# Patient Record
Sex: MALE | Race: WHITE | Employment: FULL TIME | URBAN - NONMETROPOLITAN AREA
[De-identification: names, ages, dates, MRNs, and addresses within clinical notes are randomized per-mention and may not be internally consistent; named-entity substitution may affect disease eponyms.]

---

## 2017-06-30 PROBLEM — K92.0 HEMATEMESIS: Status: ACTIVE | Noted: 2017-06-30

## 2017-06-30 PROBLEM — K92.1 MELENA: Status: ACTIVE | Noted: 2017-06-30

## 2018-03-12 ENCOUNTER — HOSPITAL ENCOUNTER (EMERGENCY)
Age: 33
Discharge: HOME | End: 2018-03-12
Attending: EMERGENCY MEDICINE
Payer: COMMERCIAL

## 2018-03-12 VITALS
OXYGEN SATURATION: 95 % | DIASTOLIC BLOOD PRESSURE: 82 MMHG | TEMPERATURE: 98.2 F | RESPIRATION RATE: 16 BRPM | BODY MASS INDEX: 29.22 KG/M2 | WEIGHT: 235 LBS | HEIGHT: 75 IN | HEART RATE: 51 BPM | SYSTOLIC BLOOD PRESSURE: 139 MMHG

## 2018-03-12 DIAGNOSIS — R10.31 ABDOMINAL PAIN, RIGHT LOWER QUADRANT: ICD-10-CM

## 2018-03-12 DIAGNOSIS — R11.2 NAUSEA AND VOMITING, INTRACTABILITY OF VOMITING NOT SPECIFIED, UNSPECIFIED VOMITING TYPE: Primary | ICD-10-CM

## 2018-03-12 LAB
ALBUMIN SERPL-MCNC: 4.2 G/DL (ref 3.5–5.2)
ALP BLD-CCNC: 166 U/L (ref 40–129)
ALT SERPL-CCNC: 79 U/L (ref 0–40)
ANION GAP SERPL CALCULATED.3IONS-SCNC: 11 MMOL/L (ref 7–16)
AST SERPL-CCNC: 48 U/L (ref 0–39)
BACTERIA: NORMAL /HPF
BASOPHILS ABSOLUTE: 0.04 E9/L (ref 0–0.2)
BASOPHILS RELATIVE PERCENT: 0.3 % (ref 0–2)
BILIRUB SERPL-MCNC: 0.7 MG/DL (ref 0–1.2)
BILIRUBIN URINE: NEGATIVE
BLOOD, URINE: ABNORMAL
BUN BLDV-MCNC: 14 MG/DL (ref 6–20)
CALCIUM SERPL-MCNC: 9.4 MG/DL (ref 8.6–10.2)
CHLORIDE BLD-SCNC: 101 MMOL/L (ref 98–107)
CLARITY: CLEAR
CO2: 30 MMOL/L (ref 22–29)
COLOR: YELLOW
CREAT SERPL-MCNC: 0.9 MG/DL (ref 0.7–1.2)
EOSINOPHILS ABSOLUTE: 0.11 E9/L (ref 0.05–0.5)
EOSINOPHILS RELATIVE PERCENT: 0.9 % (ref 0–6)
EPITHELIAL CELLS, UA: NORMAL /HPF
GFR AFRICAN AMERICAN: >60
GFR NON-AFRICAN AMERICAN: >60 ML/MIN/1.73
GLUCOSE BLD-MCNC: 125 MG/DL (ref 74–109)
GLUCOSE URINE: NEGATIVE MG/DL
HCT VFR BLD CALC: 41 % (ref 37–54)
HEMOGLOBIN: 14.6 G/DL (ref 12.5–16.5)
IMMATURE GRANULOCYTES #: 0.04 E9/L
IMMATURE GRANULOCYTES %: 0.3 % (ref 0–5)
KETONES, URINE: NEGATIVE MG/DL
LACTIC ACID: 1.2 MMOL/L (ref 0.5–2.2)
LEUKOCYTE ESTERASE, URINE: NEGATIVE
LYMPHOCYTES ABSOLUTE: 1.28 E9/L (ref 1.5–4)
LYMPHOCYTES RELATIVE PERCENT: 10.8 % (ref 20–42)
MCH RBC QN AUTO: 30 PG (ref 26–35)
MCHC RBC AUTO-ENTMCNC: 35.6 % (ref 32–34.5)
MCV RBC AUTO: 84.2 FL (ref 80–99.9)
MONOCYTES ABSOLUTE: 0.52 E9/L (ref 0.1–0.95)
MONOCYTES RELATIVE PERCENT: 4.4 % (ref 2–12)
NEUTROPHILS ABSOLUTE: 9.82 E9/L (ref 1.8–7.3)
NEUTROPHILS RELATIVE PERCENT: 83.3 % (ref 43–80)
NITRITE, URINE: NEGATIVE
PDW BLD-RTO: 12.1 FL (ref 11.5–15)
PH UA: 6.5 (ref 5–9)
PLATELET # BLD: 229 E9/L (ref 130–450)
PMV BLD AUTO: 10.7 FL (ref 7–12)
POTASSIUM SERPL-SCNC: 4 MMOL/L (ref 3.5–5)
PROTEIN UA: 100 MG/DL
RBC # BLD: 4.87 E12/L (ref 3.8–5.8)
RBC UA: >20 /HPF (ref 0–2)
SODIUM BLD-SCNC: 142 MMOL/L (ref 132–146)
SPECIFIC GRAVITY UA: 1.02 (ref 1–1.03)
TOTAL PROTEIN: 7.5 G/DL (ref 6.4–8.3)
UROBILINOGEN, URINE: 0.2 E.U./DL
WBC # BLD: 11.8 E9/L (ref 4.5–11.5)
WBC UA: NORMAL /HPF (ref 0–5)

## 2018-03-12 PROCEDURE — 81003 URINALYSIS AUTO W/O SCOPE: CPT

## 2018-03-12 PROCEDURE — 83605 ASSAY OF LACTIC ACID: CPT

## 2018-03-12 PROCEDURE — 36415 COLL VENOUS BLD VENIPUNCTURE: CPT

## 2018-03-12 PROCEDURE — 81015 MICROSCOPIC EXAM OF URINE: CPT

## 2018-03-12 PROCEDURE — 80053 COMPREHEN METABOLIC PANEL: CPT

## 2018-03-12 PROCEDURE — 99283 EMERGENCY DEPT VISIT LOW MDM: CPT

## 2018-03-12 PROCEDURE — 85025 COMPLETE CBC W/AUTO DIFF WBC: CPT

## 2018-03-12 RX ORDER — DICYCLOMINE HYDROCHLORIDE 10 MG/1
10 CAPSULE ORAL
Qty: 30 CAPSULE | Refills: 11 | Status: SHIPPED | OUTPATIENT
Start: 2018-03-12 | End: 2018-04-11

## 2018-03-12 RX ORDER — PANTOPRAZOLE SODIUM 20 MG/1
40 TABLET, DELAYED RELEASE ORAL DAILY
Qty: 30 TABLET | Refills: 0 | Status: ON HOLD | OUTPATIENT
Start: 2018-03-12 | End: 2018-08-04 | Stop reason: HOSPADM

## 2018-03-12 ASSESSMENT — PAIN DESCRIPTION - LOCATION: LOCATION: ABDOMEN

## 2018-03-12 ASSESSMENT — PAIN SCALES - GENERAL: PAINLEVEL_OUTOF10: 5

## 2018-03-12 ASSESSMENT — PAIN DESCRIPTION - PAIN TYPE: TYPE: ACUTE PAIN

## 2018-03-12 ASSESSMENT — PAIN DESCRIPTION - DESCRIPTORS: DESCRIPTORS: CONSTANT;SHARP

## 2018-03-12 ASSESSMENT — PAIN DESCRIPTION - ORIENTATION: ORIENTATION: RIGHT;LOWER

## 2018-03-13 NOTE — ED TRIAGE NOTES
Patient complaint of sharp right lower quad pain starting at 2000, with vomiting, eased up just prior to arrival but still painful. Patient ate fish and cauliflower for dinner tonight. Patient is alert and oriented times 4, skin is warm and dry, respirations appear easy and unlabored, patient resting in bed in no acute distress.   Electronically signed by Moisés Varma RN on 3/12/2018 at 9:50 PM

## 2018-03-13 NOTE — ED PROVIDER NOTES
The history is provided by the patient and the spouse. No  was used. Nausea & Vomiting   Severity:  Moderate  Duration:  12 hours  Timing:  Intermittent  Number of daily episodes:  3 today  Quality:  Stomach contents and undigested food  Able to tolerate:  Solids  Progression:  Resolved  Chronicity:  New  Recent urination:  Normal  Relieved by:  Nothing  Worsened by:  Nothing  Ineffective treatments:  None tried  Associated symptoms: abdominal pain and chills    Associated symptoms: no diarrhea, no fever, no headaches, no sore throat and no URI        Review of Systems   Constitutional: Positive for chills. Negative for fever. HENT: Negative for sore throat. Gastrointestinal: Positive for abdominal pain and nausea. Negative for diarrhea. Neurological: Negative for headaches. All other systems reviewed and are negative. Physical Exam   Constitutional: He is oriented to person, place, and time. He appears well-developed and well-nourished. HENT:   Head: Normocephalic and atraumatic. Right Ear: External ear normal.   Left Ear: External ear normal.   Eyes: EOM are normal. Pupils are equal, round, and reactive to light. Neck: Normal range of motion. Neck supple. Cardiovascular: Normal rate, regular rhythm, normal heart sounds and intact distal pulses. Pulmonary/Chest: Effort normal and breath sounds normal.   Abdominal: Soft. Bowel sounds are normal. There is tenderness (right lower quadrants). Genitourinary:   Genitourinary Comments: deferred   Musculoskeletal: Normal range of motion. Neurological: He is alert and oriented to person, place, and time. He has normal reflexes. Skin: Skin is warm. Psychiatric: He has a normal mood and affect. His behavior is normal. Judgment and thought content normal.   Nursing note and vitals reviewed.       Procedures    MDM    ED Course as of Mar 14 0819   Mon Mar 12, 2018   2330 Discussed with patient's wife this could be a kidney 109 mg/dL    BUN 14 6 - 20 mg/dL    CREATININE 0.9 0.7 - 1.2 mg/dL    GFR Non-African American >60 >=60 mL/min/1.73    GFR African American >60     Calcium 9.4 8.6 - 10.2 mg/dL    Total Protein 7.5 6.4 - 8.3 g/dL    Alb 4.2 3.5 - 5.2 g/dL    Total Bilirubin 0.7 0.0 - 1.2 mg/dL    Alkaline Phosphatase 166 (H) 40 - 129 U/L    ALT 79 (H) 0 - 40 U/L    AST 48 (H) 0 - 39 U/L   Lactic Acid, Plasma   Result Value Ref Range    Lactic Acid 1.2 0.5 - 2.2 mmol/L   Urinalysis   Result Value Ref Range    Color, UA Yellow Straw/Yellow    Clarity, UA Clear Clear    Glucose, Ur Negative Negative mg/dL    Bilirubin Urine Negative Negative    Ketones, Urine Negative Negative mg/dL    Specific Gravity, UA 1.025 1.005 - 1.030    Blood, Urine LARGE (A) Negative    pH, UA 6.5 5.0 - 9.0    Protein,  (A) Negative mg/dL    Urobilinogen, Urine 0.2 <2.0 E.U./dL    Nitrite, Urine Negative Negative    Leukocyte Esterase, Urine Negative Negative   Microscopic Urinalysis   Result Value Ref Range    WBC, UA 0-1 0 - 5 /HPF    RBC, UA >20 0 - 2 /HPF    Epi Cells RARE /HPF    Bacteria, UA NONE /HPF     No orders to display       ------------------------- NURSING NOTES AND VITALS REVIEWED ---------------------------   The nursing notes within the ED encounter and vital signs as below have been reviewed. /82   Pulse 51   Temp 98.2 °F (36.8 °C) (Oral)   Resp 16   Ht 6' 3\" (1.905 m)   Wt 235 lb (106.6 kg)   SpO2 95%   BMI 29.37 kg/m²   Oxygen Saturation Interpretation: Normal      ------------------------------------------ PROGRESS NOTES ------------------------------------------   I have spoken with the patient and wife and discussed todays results, in addition to providing specific details for the plan of care and counseling regarding the diagnosis and prognosis.   Their questions are answered at this time and they are agreeable with the plan.      --------------------------------- ADDITIONAL PROVIDER NOTES

## 2018-03-17 ASSESSMENT — ENCOUNTER SYMPTOMS
DIARRHEA: 0
ABDOMINAL PAIN: 1
SORE THROAT: 0
NAUSEA: 1

## 2018-08-02 ENCOUNTER — HOSPITAL ENCOUNTER (INPATIENT)
Age: 33
LOS: 2 days | Discharge: HOME | DRG: 379 | End: 2018-08-04
Attending: EMERGENCY MEDICINE | Admitting: INTERNAL MEDICINE
Payer: COMMERCIAL

## 2018-08-02 PROBLEM — K92.2 GI BLEED: Status: ACTIVE | Noted: 2018-08-02

## 2018-08-02 LAB
ABO/RH: NORMAL
ALBUMIN SERPL-MCNC: 4.3 G/DL (ref 3.5–5.2)
ALP BLD-CCNC: 76 U/L (ref 40–129)
ALT SERPL-CCNC: 40 U/L (ref 0–40)
ANION GAP SERPL CALCULATED.3IONS-SCNC: 10 MMOL/L (ref 7–16)
ANTIBODY SCREEN: NORMAL
AST SERPL-CCNC: 24 U/L (ref 0–39)
BASOPHILS ABSOLUTE: 0.04 E9/L (ref 0–0.2)
BASOPHILS RELATIVE PERCENT: 0.5 % (ref 0–2)
BILIRUB SERPL-MCNC: 0.8 MG/DL (ref 0–1.2)
BUN BLDV-MCNC: 25 MG/DL (ref 6–20)
CALCIUM SERPL-MCNC: 9 MG/DL (ref 8.6–10.2)
CHLORIDE BLD-SCNC: 104 MMOL/L (ref 98–107)
CO2: 27 MMOL/L (ref 22–29)
CREAT SERPL-MCNC: 0.8 MG/DL (ref 0.7–1.2)
EOSINOPHILS ABSOLUTE: 0.12 E9/L (ref 0.05–0.5)
EOSINOPHILS RELATIVE PERCENT: 1.4 % (ref 0–6)
GFR AFRICAN AMERICAN: >60
GFR NON-AFRICAN AMERICAN: >60 ML/MIN/1.73
GLUCOSE BLD-MCNC: 98 MG/DL (ref 74–109)
HCT VFR BLD CALC: 33.3 % (ref 37–54)
HEMOGLOBIN: 11.8 G/DL (ref 12.5–16.5)
IMMATURE GRANULOCYTES #: 0.02 E9/L
IMMATURE GRANULOCYTES %: 0.2 % (ref 0–5)
LIPASE: 38 U/L (ref 13–60)
LYMPHOCYTES ABSOLUTE: 2.5 E9/L (ref 1.5–4)
LYMPHOCYTES RELATIVE PERCENT: 28.8 % (ref 20–42)
MCH RBC QN AUTO: 30.6 PG (ref 26–35)
MCHC RBC AUTO-ENTMCNC: 35.4 % (ref 32–34.5)
MCV RBC AUTO: 86.5 FL (ref 80–99.9)
MONOCYTES ABSOLUTE: 0.49 E9/L (ref 0.1–0.95)
MONOCYTES RELATIVE PERCENT: 5.6 % (ref 2–12)
NEUTROPHILS ABSOLUTE: 5.52 E9/L (ref 1.8–7.3)
NEUTROPHILS RELATIVE PERCENT: 63.5 % (ref 43–80)
PDW BLD-RTO: 13 FL (ref 11.5–15)
PLATELET # BLD: 231 E9/L (ref 130–450)
PMV BLD AUTO: 11.2 FL (ref 7–12)
POTASSIUM SERPL-SCNC: 3.9 MMOL/L (ref 3.5–5)
RBC # BLD: 3.85 E12/L (ref 3.8–5.8)
SODIUM BLD-SCNC: 141 MMOL/L (ref 132–146)
TOTAL PROTEIN: 6.7 G/DL (ref 6.4–8.3)
WBC # BLD: 8.7 E9/L (ref 4.5–11.5)

## 2018-08-02 PROCEDURE — 86901 BLOOD TYPING SEROLOGIC RH(D): CPT

## 2018-08-02 PROCEDURE — 6360000002 HC RX W HCPCS: Performed by: EMERGENCY MEDICINE

## 2018-08-02 PROCEDURE — 99285 EMERGENCY DEPT VISIT HI MDM: CPT

## 2018-08-02 PROCEDURE — C9113 INJ PANTOPRAZOLE SODIUM, VIA: HCPCS | Performed by: EMERGENCY MEDICINE

## 2018-08-02 PROCEDURE — 36415 COLL VENOUS BLD VENIPUNCTURE: CPT

## 2018-08-02 PROCEDURE — 96374 THER/PROPH/DIAG INJ IV PUSH: CPT

## 2018-08-02 PROCEDURE — 86900 BLOOD TYPING SEROLOGIC ABO: CPT

## 2018-08-02 PROCEDURE — 1200000000 HC SEMI PRIVATE

## 2018-08-02 PROCEDURE — 80053 COMPREHEN METABOLIC PANEL: CPT

## 2018-08-02 PROCEDURE — 83690 ASSAY OF LIPASE: CPT

## 2018-08-02 PROCEDURE — 85025 COMPLETE CBC W/AUTO DIFF WBC: CPT

## 2018-08-02 PROCEDURE — 86850 RBC ANTIBODY SCREEN: CPT

## 2018-08-02 PROCEDURE — 2580000003 HC RX 258: Performed by: EMERGENCY MEDICINE

## 2018-08-02 RX ORDER — NICOTINE POLACRILEX 4 MG
15 LOZENGE BUCCAL PRN
Status: DISCONTINUED | OUTPATIENT
Start: 2018-08-02 | End: 2018-08-04 | Stop reason: HOSPADM

## 2018-08-02 RX ORDER — SODIUM CHLORIDE 0.9 % (FLUSH) 0.9 %
10 SYRINGE (ML) INJECTION PRN
Status: DISCONTINUED | OUTPATIENT
Start: 2018-08-02 | End: 2018-08-04 | Stop reason: HOSPADM

## 2018-08-02 RX ORDER — SODIUM CHLORIDE 0.9 % (FLUSH) 0.9 %
10 SYRINGE (ML) INJECTION EVERY 12 HOURS SCHEDULED
Status: DISCONTINUED | OUTPATIENT
Start: 2018-08-02 | End: 2018-08-04 | Stop reason: HOSPADM

## 2018-08-02 RX ORDER — DEXTROSE MONOHYDRATE 25 G/50ML
12.5 INJECTION, SOLUTION INTRAVENOUS PRN
Status: DISCONTINUED | OUTPATIENT
Start: 2018-08-02 | End: 2018-08-04 | Stop reason: HOSPADM

## 2018-08-02 RX ORDER — PANTOPRAZOLE SODIUM 40 MG/10ML
80 INJECTION, POWDER, LYOPHILIZED, FOR SOLUTION INTRAVENOUS ONCE
Status: COMPLETED | OUTPATIENT
Start: 2018-08-02 | End: 2018-08-02

## 2018-08-02 RX ORDER — DEXTROSE MONOHYDRATE 50 MG/ML
100 INJECTION, SOLUTION INTRAVENOUS PRN
Status: DISCONTINUED | OUTPATIENT
Start: 2018-08-02 | End: 2018-08-04 | Stop reason: HOSPADM

## 2018-08-02 RX ADMIN — SODIUM CHLORIDE 8 MG/HR: 9 INJECTION, SOLUTION INTRAVENOUS at 20:18

## 2018-08-02 RX ADMIN — PANTOPRAZOLE SODIUM 80 MG: 40 INJECTION, POWDER, FOR SOLUTION INTRAVENOUS at 18:25

## 2018-08-02 ASSESSMENT — ENCOUNTER SYMPTOMS
SHORTNESS OF BREATH: 0
BLOOD IN STOOL: 0
VOMITING: 0
DIARRHEA: 0
BACK PAIN: 0
ABDOMINAL PAIN: 1
BLURRED VISION: 0
NAUSEA: 0

## 2018-08-02 ASSESSMENT — PAIN SCALES - GENERAL
PAINLEVEL_OUTOF10: 0
PAINLEVEL_OUTOF10: 5

## 2018-08-02 ASSESSMENT — PAIN DESCRIPTION - DESCRIPTORS: DESCRIPTORS: DISCOMFORT

## 2018-08-02 ASSESSMENT — PAIN DESCRIPTION - PAIN TYPE: TYPE: ACUTE PAIN

## 2018-08-02 ASSESSMENT — PAIN DESCRIPTION - LOCATION: LOCATION: ABDOMEN

## 2018-08-02 NOTE — Clinical Note
Patient Class: Inpatient [101]   REQUIRED: Diagnosis: GI bleed [[id number]]   Estimated Length of Stay: Estimated stay of less than 2 midnights   Future Attending Provider: Demetrio Buchanan [[id number]]

## 2018-08-02 NOTE — ED PROVIDER NOTES
ABO/Rh A NEG     Antibody Screen NEG        RADIOLOGY:  Interpreted by Radiologist.  No orders to display       ------------------------- NURSING NOTES AND VITALS REVIEWED ---------------------------   The nursing notes within the ED encounter and vital signs as below have been reviewed. /72   Pulse 110   Temp 98.3 °F (36.8 °C) (Oral)   Resp 16   Ht 6' 3\" (1.905 m)   Wt 239 lb 4 oz (108.5 kg)   SpO2 99%   BMI 29.90 kg/m²   Oxygen Saturation Interpretation: Normal      ---------------------------------------------------PHYSICAL EXAM--------------------------------------      Constitutional/General: Alert and oriented x3, well appearing,  Head: NC/AT  Eyes: PERRL, EOMI  Mouth: Oropharynx clear, handling secretions,   Neck: Supple, full ROM,   Pulmonary: Lungs clear to auscultation bilaterally. Not in respiratory distress  Cardiovascular:  Regular rate and rhythm. 2+ distal pulses  Abdomen: Soft, mild epigastric tenderness. No rebound or guarding, non distended, stool is black and Hemoccult positive  Extremities: Moves all extremities x 4. Warm and well perfused  Skin: warm and dry without rash. No pallor or diaphoresis  Neurologic: GCS 15.   Psych: Normal Affect      ------------------------------ ED COURSE/MEDICAL DECISION MAKING----------------------  Medications   pantoprazole (PROTONIX) 80 mg in sodium chloride 0.9 % 100 mL infusion (not administered)   pantoprazole (PROTONIX) injection 80 mg (80 mg Intravenous Given 18)         Medical Decision Makin  Discussed results of labs with patient. Will discuss findings with the neurology. 1  Spoke with Dr. Lizbeth Horvath (Gastroenterology). Discussed case. He recommends that the patient be admitted so that they can scope him in the morning. He will provide consultation. Discussed this conversation with the patient and he is agreeable to admission for treatment and evaluation.   Spoke with Dr. Phill Prabhakar (Medicine). Discussed case. He will admit this patient. Counseling: The emergency provider has spoken with the patient and discussed todays results, in addition to providing specific details for the plan of care and counseling regarding the diagnosis and prognosis. Questions are answered at this time and they are agreeable with the plan.      --------------------------------- IMPRESSION AND DISPOSITION ---------------------------------    IMPRESSION  1.  Upper GI bleed        DISPOSITION  Disposition: Admit to med/surg floor  Patient condition is fair                Rick Brannon DO  08/02/18 2006

## 2018-08-02 NOTE — H&P
hematochezia, melena, acholic stools  Genito-Urinary:  Dysuria, urgency, frequency, hematuria  Musculoskeletal:  Joint pain, joint stiffness, joint swelling, muscle pain  Neurology:  Headache, focal neurological deficits, weakness, numbness, paresthesia  Derm:  Rashes, ulcers, excoriations, bruising  Extremities:  Decreased ROM, peripheral edema, mottling    Past Medical Hx:  Past Medical History:   Diagnosis Date    Neck pain        Past Surgical Hx:  Past Surgical History:   Procedure Laterality Date    CARPAL TUNNEL RELEASE         Family Hx:  History reviewed. No pertinent family history. Home Medications:  Prior to Admission medications    Medication Sig Start Date End Date Taking? Authorizing Provider   pantoprazole (PROTONIX) 20 MG tablet Take 2 tablets by mouth daily 3/12/18   Garfield Ortega MD   dicyclomine (BENTYL) 10 MG capsule Take 1 capsule by mouth 4 times daily (before meals and nightly) 3/12/18 4/11/18  Garfield Ortega MD       Allergies:  Patient has no known allergies. Social Hx:  Social History     Social History    Marital status:      Spouse name: N/A    Number of children: N/A    Years of education: N/A     Occupational History    Not on file.      Social History Main Topics    Smoking status: Never Smoker    Smokeless tobacco: Never Used    Alcohol use No    Drug use: No    Sexual activity: Yes     Partners: Female     Other Topics Concern    Not on file     Social History Narrative    No narrative on file       Vitals:  /72   Pulse 110   Temp 98.3 °F (36.8 °C) (Oral)   Resp 16   Ht 6' 3\" (1.905 m)   Wt 239 lb 4 oz (108.5 kg)   SpO2 99%   BMI 29.90 kg/m²     Physical Exam:  General: Awake, alert, oriented to person, place, time, and purpose, appears stated age, cooperative, no acute distress   Head: Normocephalic, atraumatic  Eyes: Conjunctivae/corneas clear, Sclera non icteric  Mouth: Mucous membranes moist  Neck: No JVD, no adenopathy,  neck is supple, trachea is midline  Back: ROM normal  Lungs: Clear to auscultation bilaterally. No retractions or use of accessory muscles. No vocal fremitus. No rhonchi, crackle or rale. Heart: Regular rate and regular rhythm, no murmur, normal S1, S2  Abdomen: Soft, epigastric tenderness; bowel sounds normal; no masses, no organomegaly  Extremities: No lower extremity edema, extremities atraumatic, no cyanosis, no clubbing, 2+ pedal pulses palpated  Skin: Normal color, normal texture, normal turgor, no rashes, no lesions  Neurologic:  Normal muscle tone throughout,  EOMI, face symmetric, equal facial muscle strength bilaterally, hearing intact,  tongue midline, Speech appropriate without slurring. Osteopathic/Structural Exam: Examined in seated and supine position. Normal thoracic kyphosis. Normal lumbar lordosis. No acute changes. Assessment and Plan     Patient is a 27 y.o. male who presented with melena. Assessment/Plan  1. Acute blood loss anemia due to GI bleed: previous history of GI bleed with EGD that showed duodenal ulcer. This was cauterized at the time      GI consulted. Plan for EGD tmrw. Started on protonix drip. Monitor h&h every 6 hours. scd for dvt prophylaxis. See orders for further plan of care. This patient was discussed with Dr. Fitz Duong. Demarco Arredondo [place], PGY2  7:34 PM  8/2/2018    The chart was reviewed and the patient was seen and examined. The case was discussed in detail with the resident and agree with current impressions and plan. The case was discussed with the patient and the patient wife at the bedside today. He denies any bowel movements since last night. His hemoglobin is 10.7 today. He is scheduled for a EGD this afternoon.     Adela Weaver D.O.  9:28 AM  8/3/2018

## 2018-08-03 ENCOUNTER — ANESTHESIA EVENT (OUTPATIENT)
Dept: ENDOSCOPY | Age: 33
DRG: 379 | End: 2018-08-03
Payer: COMMERCIAL

## 2018-08-03 ENCOUNTER — ANESTHESIA (OUTPATIENT)
Dept: ENDOSCOPY | Age: 33
DRG: 379 | End: 2018-08-03
Payer: COMMERCIAL

## 2018-08-03 VITALS
DIASTOLIC BLOOD PRESSURE: 74 MMHG | SYSTOLIC BLOOD PRESSURE: 130 MMHG | RESPIRATION RATE: 14 BRPM | OXYGEN SATURATION: 99 %

## 2018-08-03 LAB
ALBUMIN SERPL-MCNC: 3.6 G/DL (ref 3.5–5.2)
ALP BLD-CCNC: 69 U/L (ref 40–129)
ALT SERPL-CCNC: 45 U/L (ref 0–40)
ANION GAP SERPL CALCULATED.3IONS-SCNC: 9 MMOL/L (ref 7–16)
AST SERPL-CCNC: 30 U/L (ref 0–39)
BILIRUB SERPL-MCNC: 1 MG/DL (ref 0–1.2)
BUN BLDV-MCNC: 21 MG/DL (ref 6–20)
CALCIUM SERPL-MCNC: 8.7 MG/DL (ref 8.6–10.2)
CHLORIDE BLD-SCNC: 107 MMOL/L (ref 98–107)
CO2: 25 MMOL/L (ref 22–29)
CREAT SERPL-MCNC: 0.9 MG/DL (ref 0.7–1.2)
GFR AFRICAN AMERICAN: >60
GFR NON-AFRICAN AMERICAN: >60 ML/MIN/1.73
GLUCOSE BLD-MCNC: 98 MG/DL (ref 74–109)
HCT VFR BLD CALC: 29.6 % (ref 37–54)
HCT VFR BLD CALC: 30.1 % (ref 37–54)
HCT VFR BLD CALC: 30.6 % (ref 37–54)
HCT VFR BLD CALC: 30.9 % (ref 37–54)
HEMOGLOBIN: 10.6 G/DL (ref 12.5–16.5)
HEMOGLOBIN: 10.7 G/DL (ref 12.5–16.5)
HEMOGLOBIN: 10.7 G/DL (ref 12.5–16.5)
HEMOGLOBIN: 10.8 G/DL (ref 12.5–16.5)
MAGNESIUM: 1.9 MG/DL (ref 1.6–2.6)
MCH RBC QN AUTO: 30.7 PG (ref 26–35)
MCHC RBC AUTO-ENTMCNC: 35.5 % (ref 32–34.5)
MCV RBC AUTO: 86.2 FL (ref 80–99.9)
PDW BLD-RTO: 12.9 FL (ref 11.5–15)
PHOSPHORUS: 3 MG/DL (ref 2.5–4.5)
PLATELET # BLD: 206 E9/L (ref 130–450)
PMV BLD AUTO: 10.9 FL (ref 7–12)
POTASSIUM SERPL-SCNC: 4.1 MMOL/L (ref 3.5–5)
RBC # BLD: 3.49 E12/L (ref 3.8–5.8)
SODIUM BLD-SCNC: 141 MMOL/L (ref 132–146)
TOTAL PROTEIN: 6 G/DL (ref 6.4–8.3)
TSH SERPL DL<=0.05 MIU/L-ACNC: 3.55 UIU/ML (ref 0.27–4.2)
WBC # BLD: 8.1 E9/L (ref 4.5–11.5)

## 2018-08-03 PROCEDURE — 6360000002 HC RX W HCPCS: Performed by: INTERNAL MEDICINE

## 2018-08-03 PROCEDURE — 3609013800 HC EGD SUBMUCOSAL/BOTOX INJECTION: Performed by: INTERNAL MEDICINE

## 2018-08-03 PROCEDURE — 7100000010 HC PHASE II RECOVERY - FIRST 15 MIN: Performed by: INTERNAL MEDICINE

## 2018-08-03 PROCEDURE — 88305 TISSUE EXAM BY PATHOLOGIST: CPT

## 2018-08-03 PROCEDURE — 80053 COMPREHEN METABOLIC PANEL: CPT

## 2018-08-03 PROCEDURE — 82728 ASSAY OF FERRITIN: CPT

## 2018-08-03 PROCEDURE — 0D598ZZ DESTRUCTION OF DUODENUM, VIA NATURAL OR ARTIFICIAL OPENING ENDOSCOPIC: ICD-10-PCS | Performed by: INTERNAL MEDICINE

## 2018-08-03 PROCEDURE — 36415 COLL VENOUS BLD VENIPUNCTURE: CPT

## 2018-08-03 PROCEDURE — 83735 ASSAY OF MAGNESIUM: CPT

## 2018-08-03 PROCEDURE — 0DB78ZX EXCISION OF STOMACH, PYLORUS, VIA NATURAL OR ARTIFICIAL OPENING ENDOSCOPIC, DIAGNOSTIC: ICD-10-PCS | Performed by: INTERNAL MEDICINE

## 2018-08-03 PROCEDURE — 3700000000 HC ANESTHESIA ATTENDED CARE: Performed by: INTERNAL MEDICINE

## 2018-08-03 PROCEDURE — 2580000003 HC RX 258: Performed by: EMERGENCY MEDICINE

## 2018-08-03 PROCEDURE — 2580000003 HC RX 258: Performed by: INTERNAL MEDICINE

## 2018-08-03 PROCEDURE — 3609017100 HC EGD: Performed by: INTERNAL MEDICINE

## 2018-08-03 PROCEDURE — 7100000011 HC PHASE II RECOVERY - ADDTL 15 MIN: Performed by: INTERNAL MEDICINE

## 2018-08-03 PROCEDURE — 85014 HEMATOCRIT: CPT

## 2018-08-03 PROCEDURE — 82607 VITAMIN B-12: CPT

## 2018-08-03 PROCEDURE — 2580000003 HC RX 258: Performed by: NURSE ANESTHETIST, CERTIFIED REGISTERED

## 2018-08-03 PROCEDURE — 2709999900 HC NON-CHARGEABLE SUPPLY: Performed by: INTERNAL MEDICINE

## 2018-08-03 PROCEDURE — 82746 ASSAY OF FOLIC ACID SERUM: CPT

## 2018-08-03 PROCEDURE — 6360000002 HC RX W HCPCS: Performed by: EMERGENCY MEDICINE

## 2018-08-03 PROCEDURE — 84100 ASSAY OF PHOSPHORUS: CPT

## 2018-08-03 PROCEDURE — C1773 RET DEV, INSERTABLE: HCPCS | Performed by: INTERNAL MEDICINE

## 2018-08-03 PROCEDURE — C9113 INJ PANTOPRAZOLE SODIUM, VIA: HCPCS | Performed by: EMERGENCY MEDICINE

## 2018-08-03 PROCEDURE — 3E0G8GC INTRODUCTION OF OTHER THERAPEUTIC SUBSTANCE INTO UPPER GI, VIA NATURAL OR ARTIFICIAL OPENING ENDOSCOPIC: ICD-10-PCS | Performed by: INTERNAL MEDICINE

## 2018-08-03 PROCEDURE — 3609013000 HC EGD TRANSORAL CONTROL BLEEDING ANY METHOD: Performed by: INTERNAL MEDICINE

## 2018-08-03 PROCEDURE — C9113 INJ PANTOPRAZOLE SODIUM, VIA: HCPCS | Performed by: INTERNAL MEDICINE

## 2018-08-03 PROCEDURE — 86677 HELICOBACTER PYLORI ANTIBODY: CPT

## 2018-08-03 PROCEDURE — 2720000010 HC SURG SUPPLY STERILE: Performed by: INTERNAL MEDICINE

## 2018-08-03 PROCEDURE — 85027 COMPLETE CBC AUTOMATED: CPT

## 2018-08-03 PROCEDURE — 83550 IRON BINDING TEST: CPT

## 2018-08-03 PROCEDURE — 3700000001 HC ADD 15 MINUTES (ANESTHESIA): Performed by: INTERNAL MEDICINE

## 2018-08-03 PROCEDURE — 1200000000 HC SEMI PRIVATE

## 2018-08-03 PROCEDURE — 83540 ASSAY OF IRON: CPT

## 2018-08-03 PROCEDURE — 0DB68ZX EXCISION OF STOMACH, VIA NATURAL OR ARTIFICIAL OPENING ENDOSCOPIC, DIAGNOSTIC: ICD-10-PCS | Performed by: INTERNAL MEDICINE

## 2018-08-03 PROCEDURE — 88342 IMHCHEM/IMCYTCHM 1ST ANTB: CPT

## 2018-08-03 PROCEDURE — 6360000002 HC RX W HCPCS: Performed by: NURSE ANESTHETIST, CERTIFIED REGISTERED

## 2018-08-03 PROCEDURE — 85018 HEMOGLOBIN: CPT

## 2018-08-03 PROCEDURE — 84443 ASSAY THYROID STIM HORMONE: CPT

## 2018-08-03 RX ORDER — MIDAZOLAM HYDROCHLORIDE 1 MG/ML
INJECTION INTRAMUSCULAR; INTRAVENOUS PRN
Status: DISCONTINUED | OUTPATIENT
Start: 2018-08-03 | End: 2018-08-03 | Stop reason: SDUPTHER

## 2018-08-03 RX ORDER — PROPOFOL 10 MG/ML
INJECTION, EMULSION INTRAVENOUS PRN
Status: DISCONTINUED | OUTPATIENT
Start: 2018-08-03 | End: 2018-08-03 | Stop reason: SDUPTHER

## 2018-08-03 RX ORDER — SODIUM CHLORIDE 9 MG/ML
INJECTION, SOLUTION INTRAVENOUS CONTINUOUS PRN
Status: DISCONTINUED | OUTPATIENT
Start: 2018-08-03 | End: 2018-08-03 | Stop reason: SDUPTHER

## 2018-08-03 RX ORDER — PANTOPRAZOLE SODIUM 40 MG/10ML
40 INJECTION, POWDER, LYOPHILIZED, FOR SOLUTION INTRAVENOUS 2 TIMES DAILY
Status: DISCONTINUED | OUTPATIENT
Start: 2018-08-03 | End: 2018-08-04 | Stop reason: HOSPADM

## 2018-08-03 RX ORDER — PROPOFOL 10 MG/ML
INJECTION, EMULSION INTRAVENOUS CONTINUOUS PRN
Status: DISCONTINUED | OUTPATIENT
Start: 2018-08-03 | End: 2018-08-03 | Stop reason: SDUPTHER

## 2018-08-03 RX ORDER — EPINEPHRINE 1 MG/ML
INJECTION, SOLUTION, CONCENTRATE INTRAVENOUS PRN
Status: DISCONTINUED | OUTPATIENT
Start: 2018-08-03 | End: 2018-08-03 | Stop reason: HOSPADM

## 2018-08-03 RX ADMIN — SODIUM CHLORIDE: 9 INJECTION, SOLUTION INTRAVENOUS at 17:25

## 2018-08-03 RX ADMIN — PROPOFOL 100 MCG/KG/MIN: 10 INJECTION, EMULSION INTRAVENOUS at 17:30

## 2018-08-03 RX ADMIN — MIDAZOLAM 2 MG: 1 INJECTION INTRAMUSCULAR; INTRAVENOUS at 17:28

## 2018-08-03 RX ADMIN — SODIUM CHLORIDE 8 MG/HR: 9 INJECTION, SOLUTION INTRAVENOUS at 05:00

## 2018-08-03 RX ADMIN — Medication 10 ML: at 22:45

## 2018-08-03 RX ADMIN — PROPOFOL 50 MG: 10 INJECTION, EMULSION INTRAVENOUS at 17:31

## 2018-08-03 RX ADMIN — PANTOPRAZOLE SODIUM 40 MG: 40 INJECTION, POWDER, FOR SOLUTION INTRAVENOUS at 21:10

## 2018-08-03 RX ADMIN — PROPOFOL 50 MG: 10 INJECTION, EMULSION INTRAVENOUS at 17:32

## 2018-08-03 RX ADMIN — PROPOFOL 100 MG: 10 INJECTION, EMULSION INTRAVENOUS at 17:30

## 2018-08-03 ASSESSMENT — PAIN DESCRIPTION - DESCRIPTORS
DESCRIPTORS: DISCOMFORT
DESCRIPTORS: DISCOMFORT

## 2018-08-03 ASSESSMENT — PAIN DESCRIPTION - LOCATION
LOCATION: ABDOMEN
LOCATION: ABDOMEN

## 2018-08-03 ASSESSMENT — PAIN DESCRIPTION - PAIN TYPE
TYPE: ACUTE PAIN
TYPE: ACUTE PAIN

## 2018-08-03 ASSESSMENT — PAIN DESCRIPTION - ORIENTATION: ORIENTATION: MID

## 2018-08-03 ASSESSMENT — PAIN SCALES - GENERAL
PAINLEVEL_OUTOF10: 0
PAINLEVEL_OUTOF10: 1
PAINLEVEL_OUTOF10: 0
PAINLEVEL_OUTOF10: 1

## 2018-08-03 ASSESSMENT — PAIN DESCRIPTION - FREQUENCY
FREQUENCY: INTERMITTENT
FREQUENCY: INTERMITTENT

## 2018-08-03 ASSESSMENT — LIFESTYLE VARIABLES: SMOKING_STATUS: 0

## 2018-08-03 ASSESSMENT — PAIN DESCRIPTION - ONSET
ONSET: ON-GOING
ONSET: ON-GOING

## 2018-08-03 NOTE — ED NOTES
Nurse to nurse called to the 3rd floor and given to Michelle HARRISON at this time. Transport put in to have this patient taken upstairs to patient's ready bed at this time.       Luis Miguel Lo RN  08/02/18 [id number]

## 2018-08-03 NOTE — PLAN OF CARE
Problem: Fluid Volume - Imbalance:  Goal: Will show no signs and symptoms of excessive bleeding  Will show no signs and symptoms of excessive bleeding  Outcome: Ongoing

## 2018-08-03 NOTE — ANESTHESIA PRE PROCEDURE
Department of Anesthesiology  Preprocedure Note       Name:  China Alvarez   Age:  27 y.o.  :  1985                                          MRN:  [id number]         Date:  8/3/2018      Surgeon: Adrianna Grant):  John Kramer MD    Procedure: Procedure(s):  EGD ESOPHAGOGASTRODUODENOSCOPY    Medications prior to admission:   Prior to Admission medications    Medication Sig Start Date End Date Taking?  Authorizing Provider   pantoprazole (PROTONIX) 20 MG tablet Take 2 tablets by mouth daily 3/12/18   Yobany Briggs MD   dicyclomine (BENTYL) 10 MG capsule Take 1 capsule by mouth 4 times daily (before meals and nightly) 3/12/18 4/11/18  Deion Feldman MD       Current medications:    Current Facility-Administered Medications   Medication Dose Route Frequency Provider Last Rate Last Dose    sodium chloride flush 0.9 % injection 10 mL  10 mL Intravenous 2 times per day Vickie Allie, DO        sodium chloride flush 0.9 % injection 10 mL  10 mL Intravenous PRN Vickie Allie, DO        magnesium hydroxide (MILK OF MAGNESIA) 400 MG/5ML suspension 30 mL  30 mL Oral Daily PRN Vickie Allie, DO        glucose (GLUTOSE) 40 % oral gel 15 g  15 g Oral PRN Vickie Allie, DO        dextrose 50 % solution 12.5 g  12.5 g Intravenous PRN Vickie Allie, DO        glucagon (rDNA) injection 1 mg  1 mg Intramuscular PRN Vickie Allie, DO        dextrose 5 % solution  100 mL/hr Intravenous PRN Vickie Allie, DO        pantoprazole (PROTONIX) 80 mg in sodium chloride 0.9 % 100 mL infusion  8 mg/hr Intravenous Continuous Artemio Morillo, DO 10 mL/hr at 18 0500 8 mg/hr at 18 0500       Allergies:  No Known Allergies    Problem List:    Patient Active Problem List   Diagnosis Code    Melena K92.1    Hematemesis K92.0    GI bleed K92.2       Past Medical History:        Diagnosis Date    Neck pain        Past Surgical History:        Procedure Laterality Date    CARPAL TUNNEL RELEASE         Social History: Social History   Substance Use Topics    Smoking status: Never Smoker    Smokeless tobacco: Never Used    Alcohol use No                                Counseling given: Not Answered      Vital Signs (Current):   Vitals:    08/02/18 2017 08/02/18 2215 08/02/18 2339 08/03/18 0700   BP: 99/65 113/68  111/80   Pulse: 81 80  66   Resp: 17 16  16   Temp: 98.3 °F (36.8 °C) 97.8 °F (36.6 °C)  98 °F (36.7 °C)   TempSrc: Oral Oral     SpO2: 98% 98% 98% 98%   Weight:       Height:                                                  BP Readings from Last 3 Encounters:   08/03/18 111/80   03/12/18 139/82   07/01/17 119/60       NPO Status:                                                                                 BMI:   Wt Readings from Last 3 Encounters:   08/02/18 239 lb 4 oz (108.5 kg)   03/12/18 235 lb (106.6 kg)   06/30/17 226 lb (102.5 kg)     Body mass index is 29.9 kg/m². CBC:   Lab Results   Component Value Date    WBC 8.1 08/03/2018    RBC 3.49 08/03/2018    HGB 10.7 08/03/2018    HCT 30.6 08/03/2018    MCV 86.2 08/03/2018    RDW 12.9 08/03/2018     08/03/2018       CMP:   Lab Results   Component Value Date     08/03/2018    K 4.1 08/03/2018     08/03/2018    CO2 25 08/03/2018    BUN 21 08/03/2018    CREATININE 0.9 08/03/2018    GFRAA >60 08/03/2018    LABGLOM >60 08/03/2018    GLUCOSE 98 08/03/2018    GLUCOSE 87 05/31/2012    PROT 6.0 08/03/2018    CALCIUM 8.7 08/03/2018    BILITOT 1.0 08/03/2018    ALKPHOS 69 08/03/2018    AST 30 08/03/2018    ALT 45 08/03/2018       POC Tests: No results for input(s): POCGLU, POCNA, POCK, POCCL, POCBUN, POCHEMO, POCHCT in the last 72 hours.     Coags:   Lab Results   Component Value Date    PROTIME 11.6 06/30/2017    INR 1.0 06/30/2017       HCG (If Applicable): No results found for: PREGTESTUR, PREGSERUM, HCG, HCGQUANT     ABGs: No results found for: PHART, PO2ART, MDD6IOO, OMR6CKK, BEART, L8QTSZOP     Type & Screen (If Applicable):  No results found for: [facility]    Anesthesia Evaluation  Patient summary reviewed  Airway: Mallampati: I  TM distance: >3 FB   Neck ROM: full  Mouth opening: > = 3 FB Dental: normal exam         Pulmonary: breath sounds clear to auscultation      (-) not a current smoker                           Cardiovascular:Negative CV ROS            Rhythm: regular  Rate: normal                    Neuro/Psych:   Negative Neuro/Psych ROS              GI/Hepatic/Renal:   (+) PUD,      (-) bowel prep and no morbid obesity      ROS comment: History of Melena this admission    . Endo/Other:    (+) blood dyscrasia: anemia:., .                 Abdominal:   (+) obese,         Vascular: negative vascular ROS. Anesthesia Plan      MAC     ASA 2       Induction: intravenous. Anesthetic plan and risks discussed with patient. Plan discussed with CRNA. DOS STAFF ADDENDUM:    Pt seen and examined, chart reviewed (including anesthesia, drug and allergy history). Anesthetic plan, risks, benefits, alternatives, and personnel involved discussed with patient. Patient verbalized an understanding and agrees to proceed. Plan discussed with care team members and agreed upon.     Navi Osorio MD  Staff Anesthesiologist  4:27 PM        Navi Osorio MD   8/3/2018

## 2018-08-03 NOTE — CONSULTS
[facility]  Dr. Hiram Pulliam M.D., Dr. Jae Vieira M.D., Dr. Al Alcazar D.O., Dr. Saúl Lewis M.D. Patient Name: Rolf Harper  MRN: [id number]  : 1985 (27 y.o. male)  Allergies: has No Known Allergies. Date of Service: 8/3/2018       Reason for Consultation:  Melena    HISTORY OF PRESENT ILLNESS:      The patient is a 27 y.o. male with significant history DUD and neck pain who presents with melena. States noted several bowel movements dark in nature over the past couple of days. History of similar presentation 17, which EGD demonstrated a 4 mm duodenal ulcer with visible vessel. He states taking NSAID on occasion for generalized aches and pains. Denies any abdominal pain, N/V, or diarrhea. Also denies any constipation. No prior Colonoscopy noted. REVIEW OF SYSTEMS:   Constitutional: Denies fever, chills, or unintentional weight loss. HEENT: Denies double or blurry vision, headaches, ear pain or ringing in the ears. No drainage from the ears, nose or throat. Cardiovascular: Denies any chest pain, irregular heartbeats, or palpitations. Respiratory: Denies shortness of breath, coughing, sputum production, hemoptysis, or wheezing. Gastrointestinal: Denies nausea, vomiting, diarrhea, or constipation. Denies any abdominal pain. Genitourinary: Denies any urinary urgency, frequency, hematuria. Voiding without difficulty. Endocrine: Denies sensitivity to heat or cold. Denies changes in hair, skin or nails. Denies excessive thirst, hunger or going to the bathroom more frequently than usual.  Extremities: Denies swelling or calf pain. Musculoskeletal: Denies muscle or joint pain, stiffness, arthritis, gout, or instability. Dermatology / Skin: Denies any rashes, ulcers, or excoriations. Denies bruising. Neurology: Denies any bowel or bladder incontinence, headache or focal neurological deficits. No weakness or paresthesia.  Denies numbness or tingling in the hands rhythm, no murmur  ABDOMEN:  normal bowel sounds in all 4 quadrants, soft, non-distended, non-tender, no masses palpated, no hepatosplenomegally  MUSCULOSKELETAL:  There is no redness, warmth, or swelling of the joints. Full range of motion noted. Motor strength is 5 out of 5 all extremities bilaterally. Tone is normal.  EXTREMITIES: No edema, 2+ pulses bilaterally (radial and dorsalis pedis)  NEUROLOGIC:  Awake, alert, oriented to name, place and time. Cranial nerves II-XII are grossly intact. Motor is 5 out of 5 bilaterally. SKIN: Normal skin color, texture, and turgor. There is no redness, warmth, or swelling. No bruising or bleeding, no mottling. PSYCH: Affect, behavior and insight are all within normal limits.       DATA:  Results for orders placed or performed during the hospital encounter of 08/02/18   CBC Auto Differential   Result Value Ref Range    WBC 8.7 4.5 - 11.5 E9/L    RBC 3.85 3.80 - 5.80 E12/L    Hemoglobin 11.8 (L) 12.5 - 16.5 g/dL    Hematocrit 33.3 (L) 37.0 - 54.0 %    MCV 86.5 80.0 - 99.9 fL    MCH 30.6 26.0 - 35.0 pg    MCHC 35.4 (H) 32.0 - 34.5 %    RDW 13.0 11.5 - 15.0 fL    Platelets 733 769 - 278 E9/L    MPV 11.2 7.0 - 12.0 fL    Neutrophils % 63.5 43.0 - 80.0 %    Immature Granulocytes % 0.2 0.0 - 5.0 %    Lymphocytes % 28.8 20.0 - 42.0 %    Monocytes % 5.6 2.0 - 12.0 %    Eosinophils % 1.4 0.0 - 6.0 %    Basophils % 0.5 0.0 - 2.0 %    Neutrophils # 5.52 1.80 - 7.30 E9/L    Immature Granulocytes # 0.02 E9/L    Lymphocytes # 2.50 1.50 - 4.00 E9/L    Monocytes # 0.49 0.10 - 0.95 E9/L    Eosinophils # 0.12 0.05 - 0.50 E9/L    Basophils # 0.04 0.00 - 0.20 E9/L   Comprehensive Metabolic Panel   Result Value Ref Range    Sodium 141 132 - 146 mmol/L    Potassium 3.9 3.5 - 5.0 mmol/L    Chloride 104 98 - 107 mmol/L    CO2 27 22 - 29 mmol/L    Anion Gap 10 7 - 16 mmol/L    Glucose 98 74 - 109 mg/dL    BUN 25 (H) 6 - 20 mg/dL    CREATININE 0.8 0.7 - 1.2 mg/dL    GFR Non-African American

## 2018-08-04 VITALS
HEART RATE: 83 BPM | SYSTOLIC BLOOD PRESSURE: 109 MMHG | TEMPERATURE: 98.1 F | BODY MASS INDEX: 29.75 KG/M2 | OXYGEN SATURATION: 97 % | RESPIRATION RATE: 16 BRPM | HEIGHT: 75 IN | WEIGHT: 239.25 LBS | DIASTOLIC BLOOD PRESSURE: 62 MMHG

## 2018-08-04 LAB
FERRITIN: 81 NG/ML
FOLATE: 16.6 NG/ML (ref 4.8–24.2)
HCT VFR BLD CALC: 27.9 % (ref 37–54)
HCT VFR BLD CALC: 28.7 % (ref 37–54)
HCT VFR BLD CALC: 30.3 % (ref 37–54)
HEMOGLOBIN: 10.2 G/DL (ref 12.5–16.5)
HEMOGLOBIN: 10.6 G/DL (ref 12.5–16.5)
HEMOGLOBIN: 10.6 G/DL (ref 12.5–16.5)
IRON SATURATION: 16 % (ref 20–55)
IRON: 52 MCG/DL (ref 59–158)
TOTAL IRON BINDING CAPACITY: 319 MCG/DL (ref 250–450)
VITAMIN B-12: 895 PG/ML (ref 211–946)

## 2018-08-04 PROCEDURE — C9113 INJ PANTOPRAZOLE SODIUM, VIA: HCPCS | Performed by: INTERNAL MEDICINE

## 2018-08-04 PROCEDURE — 85018 HEMOGLOBIN: CPT

## 2018-08-04 PROCEDURE — 36415 COLL VENOUS BLD VENIPUNCTURE: CPT

## 2018-08-04 PROCEDURE — 2580000003 HC RX 258: Performed by: INTERNAL MEDICINE

## 2018-08-04 PROCEDURE — 6360000002 HC RX W HCPCS: Performed by: INTERNAL MEDICINE

## 2018-08-04 PROCEDURE — 85014 HEMATOCRIT: CPT

## 2018-08-04 RX ORDER — PANTOPRAZOLE SODIUM 40 MG/1
40 TABLET, DELAYED RELEASE ORAL 2 TIMES DAILY
Qty: 60 TABLET | Refills: 2 | Status: SHIPPED | OUTPATIENT
Start: 2018-08-04

## 2018-08-04 RX ADMIN — Medication 10 ML: at 08:32

## 2018-08-04 RX ADMIN — PANTOPRAZOLE SODIUM 40 MG: 40 INJECTION, POWDER, FOR SOLUTION INTRAVENOUS at 08:31

## 2018-08-04 ASSESSMENT — PAIN SCALES - GENERAL
PAINLEVEL_OUTOF10: 0
PAINLEVEL_OUTOF10: 0

## 2018-08-04 NOTE — PROGRESS NOTES
Name:  [facility]  :  1985  MRN:  [id number]  Room:  71/1506-21  DOS:  2018    Landon Laird  [facility]  Dr. Jefry Weinstein M.D. Dr. Aixa Naylor M.D. FAMILIA Mishra.O. Dr. Alpa Lindsey M.D. Dr. Tiffany Vidal D.O.    -NP Progress Note-    PCP:  VILMA Solomon  Admitting Physician:  Casey Lopez DO  Chief Complaint:    Chief Complaint   Patient presents with    Abdominal Pain     abd pain stools dark today seen here 1 year ago for same problem had ulcer at that time       Subjective  Denies abdominal pain / nausea / vomiting. Tolerating diet.       Physical Examination  Vitals:  /62   Pulse 83   Temp 98.1 °F (36.7 °C)   Resp 16   Ht 6' 3\" (1.905 m)   Wt 239 lb 4 oz (108.5 kg)   SpO2 97%   BMI 29.90 kg/m²   General Appearance:  awake, alert, and oriented to person, place, time, and purpose; appears stated age and cooperative; no apparent distress no labored breathing  HEENT:  PERRL; EOMI; sclera clear; buccal mucosa moist  Neck:  supple; trachea midline; no thyromegaly; no JVD; no bruits  Heart:  rhythm regular; rate controlled; no murmurs  Lungs:  symmetrical; clear to auscultation bilaterally; no wheezes; no rhonchi; no rales  Abdomen:  soft, non-tender, non-distended; bowel sounds positive; no organomegaly or masses; no pain on palpation  Extremities:  peripheral pulses present; no peripheral edema; no ulcers  Neurologic:  alert and oriented x 3; no focal deficit; cranial nerves grossly intact  Skin:  no petechia; no hemorrhage; no wounds    Medications  Scheduled Meds    pantoprazole  40 mg Intravenous BID    sodium chloride flush  10 mL Intravenous 2 times per day     Infusion Meds    dextrose       PRN Meds sodium chloride flush, magnesium hydroxide, glucose, dextrose, glucagon (rDNA), dextrose    Laboratory Data  Recent Results (from the past 24 hour(s))   Hemoglobin and hematocrit, blood    Collection Time: 18  7:46 PM
Pt. alert and oriented. Denies complaints. Procedure explained and acknowledges.
6.5 03/12/2018    WBCUA 0-1 03/12/2018    WBCUA NONE 05/31/2012    RBCUA >20 03/12/2018    RBCUA NONE 05/31/2012    BACTERIA NONE 03/12/2018    CLARITYU Clear 03/12/2018    SPECGRAV 1.025 03/12/2018    LEUKOCYTESUR Negative 03/12/2018    UROBILINOGEN 0.2 03/12/2018    BILIRUBINUR Negative 03/12/2018    BILIRUBINUR NEGATIVE 05/31/2012    BLOODU LARGE 03/12/2018    GLUCOSEU Negative 03/12/2018    GLUCOSEU NEGATIVE 05/31/2012    AMORPHOUS MANY 05/31/2012     HgBA1c:  No results found for: LABA1C  FLP:    Lab Results   Component Value Date    TRIG 263 06/30/2017    HDL 28 06/30/2017    LDLCALC 8 06/30/2017    LABVLDL 53 06/30/2017     TSH:    Lab Results   Component Value Date    TSH 3.550 08/03/2018     LIPASE:    Lab Results   Component Value Date    LIPASE 38 08/02/2018       No results for input(s): GLUMET in the last 72 hours. No orders to display        pantoprazole  40 mg Intravenous BID    sodium chloride flush  10 mL Intravenous 2 times per day        Assessment; Active Problems:    GI bleed From duodenal ulcer    Normocytic anemia from GI bleeding        Plan:   Discharge home when okay with GI    See orders.         Marilee Dodd DO  11:38 AM  8/4/2018

## 2018-08-09 LAB — HELICOBACTER PYLORI IGG: NORMAL

## 2019-09-18 NOTE — ANESTHESIA POSTPROCEDURE EVALUATION
Department of Anesthesiology  Postprocedure Note    Patient: Crispin Brooks  MRN: [id number]  YOB: 1985  Date of evaluation: 8/3/2018  Time:  6:16 PM     Procedure Summary     Date:  08/03/18 Room / Location:  [place] [address] / [facility], [place] ENDOSCOPY    Anesthesia Start:  3539 Anesthesia Stop:  0985    Procedures:       EGD ESOPHAGOGASTRODUODENOSCOPY (N/A )      EGD SUBMUCOSAL INJECTION OF DUODENAL BULB ULCER      EGD CONTROL HEMORRHAGE Diagnosis:  (N/A)    Surgeon:  Cheryl Swenson MD Responsible Provider:  Nikki Landeros MD    Anesthesia Type:  MAC ASA Status:  2          Anesthesia Type: MAC    Pierre Phase I: Pierre Score: 9    Pierre Phase II:      Last vitals: Reviewed and per EMR flowsheets.        Anesthesia Post Evaluation    Patient location during evaluation: PACU  Patient participation: complete - patient participated  Level of consciousness: awake and alert  Airway patency: patent  Nausea & Vomiting: no nausea and no vomiting  Complications: no  Cardiovascular status: hemodynamically stable  Respiratory status: acceptable  Hydration status: euvolemic
165.1

## 2024-10-29 ENCOUNTER — OFFICE VISIT (OUTPATIENT)
Dept: URGENT CARE | Facility: URGENT CARE | Age: 39
End: 2024-10-29
Payer: COMMERCIAL

## 2024-10-29 ENCOUNTER — HOSPITAL ENCOUNTER (OUTPATIENT)
Dept: RADIOLOGY | Facility: CLINIC | Age: 39
Discharge: HOME | End: 2024-10-29
Payer: COMMERCIAL

## 2024-10-29 VITALS
HEART RATE: 71 BPM | DIASTOLIC BLOOD PRESSURE: 110 MMHG | WEIGHT: 259.7 LBS | TEMPERATURE: 98.3 F | RESPIRATION RATE: 16 BRPM | SYSTOLIC BLOOD PRESSURE: 168 MMHG | BODY MASS INDEX: 32.46 KG/M2 | OXYGEN SATURATION: 99 %

## 2024-10-29 DIAGNOSIS — M25.521 RIGHT ELBOW PAIN: Primary | ICD-10-CM

## 2024-10-29 DIAGNOSIS — M25.521 RIGHT ELBOW PAIN: ICD-10-CM

## 2024-10-29 PROCEDURE — 73080 X-RAY EXAM OF ELBOW: CPT | Mod: RIGHT SIDE | Performed by: RADIOLOGY

## 2024-10-29 PROCEDURE — 99203 OFFICE O/P NEW LOW 30 MIN: CPT

## 2024-10-29 PROCEDURE — 73080 X-RAY EXAM OF ELBOW: CPT | Mod: RT

## (undated) DEVICE — GOWN ISOLATN REG YEL M WT MULTIPLY SIDETIE LEV 2

## (undated) DEVICE — SPONGE GZ 4IN 4IN 4 PLY N WVN AVANT

## (undated) DEVICE — TUBING, SUCTION, 1/4" X 10', STRAIGHT: Brand: MEDLINE

## (undated) DEVICE — Device: Brand: DEFENDO VALVE AND CONNECTOR KIT

## (undated) DEVICE — MASK,FACE,MAXFLUIDPROTECT,SHIELD/ERLPS: Brand: MEDLINE

## (undated) DEVICE — YANKAUER,BULB TIP,W/O VENT,RIGID,STERILE: Brand: MEDLINE

## (undated) DEVICE — CONTAINER SPEC COLL 960ML POLYPR TRIANG GRAD INTAKE/OUTPUT

## (undated) DEVICE — NEEDLE SCLERO 25GA L240CM OD0.51MM ID0.24MM EXTN L4MM SHTH

## (undated) DEVICE — LUBRICANT SURG JELLY ST BACTER TUBE 4.25OZ

## (undated) DEVICE — KENDALL 450 SERIES MONITORING FOAM ELECTRODE - RECTANGULAR SHAPE ( 3/PK): Brand: KENDALL

## (undated) DEVICE — BLOCK BITE 60FR CAREGUARD

## (undated) DEVICE — FORCEPS BX L240CM JAW DIA2.8MM L CAP W/ NDL MIC MESH TOOTH

## (undated) DEVICE — KIT BEDSIDE REVITAL OX 500ML

## (undated) DEVICE — 6 X 9  1.75MIL 4-WALL LABGUARD: Brand: MINIGRIP COMMERCIAL LLC

## (undated) DEVICE — CATHETER ELECHEMSTAS 7FR L300CM CHN 2.8MM STD CONN DISP G